# Patient Record
Sex: FEMALE | Race: WHITE | NOT HISPANIC OR LATINO | Employment: FULL TIME | URBAN - METROPOLITAN AREA
[De-identification: names, ages, dates, MRNs, and addresses within clinical notes are randomized per-mention and may not be internally consistent; named-entity substitution may affect disease eponyms.]

---

## 2024-09-08 ENCOUNTER — APPOINTMENT (OUTPATIENT)
Dept: RADIOLOGY | Facility: HOSPITAL | Age: 59
End: 2024-09-08
Payer: COMMERCIAL

## 2024-09-08 ENCOUNTER — HOSPITAL ENCOUNTER (EMERGENCY)
Facility: HOSPITAL | Age: 59
Discharge: HOME | End: 2024-09-08
Payer: COMMERCIAL

## 2024-09-08 VITALS
HEART RATE: 67 BPM | HEIGHT: 62 IN | OXYGEN SATURATION: 98 % | BODY MASS INDEX: 33.31 KG/M2 | DIASTOLIC BLOOD PRESSURE: 100 MMHG | SYSTOLIC BLOOD PRESSURE: 181 MMHG | TEMPERATURE: 97.2 F | WEIGHT: 181 LBS | RESPIRATION RATE: 18 BRPM

## 2024-09-08 DIAGNOSIS — M54.42 ACUTE LEFT-SIDED LOW BACK PAIN WITH LEFT-SIDED SCIATICA: Primary | ICD-10-CM

## 2024-09-08 PROCEDURE — 72100 X-RAY EXAM L-S SPINE 2/3 VWS: CPT | Performed by: RADIOLOGY

## 2024-09-08 PROCEDURE — 2500000001 HC RX 250 WO HCPCS SELF ADMINISTERED DRUGS (ALT 637 FOR MEDICARE OP): Performed by: PHYSICIAN ASSISTANT

## 2024-09-08 PROCEDURE — 2500000004 HC RX 250 GENERAL PHARMACY W/ HCPCS (ALT 636 FOR OP/ED): Performed by: PHYSICIAN ASSISTANT

## 2024-09-08 PROCEDURE — 99283 EMERGENCY DEPT VISIT LOW MDM: CPT

## 2024-09-08 PROCEDURE — 72100 X-RAY EXAM L-S SPINE 2/3 VWS: CPT

## 2024-09-08 PROCEDURE — 96372 THER/PROPH/DIAG INJ SC/IM: CPT | Performed by: PHYSICIAN ASSISTANT

## 2024-09-08 PROCEDURE — 2500000005 HC RX 250 GENERAL PHARMACY W/O HCPCS: Performed by: PHYSICIAN ASSISTANT

## 2024-09-08 RX ORDER — ORPHENADRINE CITRATE 30 MG/ML
60 INJECTION INTRAMUSCULAR; INTRAVENOUS ONCE
Status: COMPLETED | OUTPATIENT
Start: 2024-09-08 | End: 2024-09-08

## 2024-09-08 RX ORDER — CYCLOBENZAPRINE HCL 10 MG
10 TABLET ORAL 2 TIMES DAILY PRN
Qty: 10 TABLET | Refills: 0 | Status: SHIPPED | OUTPATIENT
Start: 2024-09-08 | End: 2024-09-13

## 2024-09-08 RX ORDER — LIDOCAINE 50 MG/G
1 PATCH TOPICAL DAILY
Qty: 5 PATCH | Refills: 0 | Status: SHIPPED | OUTPATIENT
Start: 2024-09-08

## 2024-09-08 RX ORDER — LIDOCAINE 560 MG/1
1 PATCH PERCUTANEOUS; TOPICAL; TRANSDERMAL DAILY
Status: DISCONTINUED | OUTPATIENT
Start: 2024-09-08 | End: 2024-09-08 | Stop reason: HOSPADM

## 2024-09-08 RX ORDER — OXYCODONE AND ACETAMINOPHEN 5; 325 MG/1; MG/1
1 TABLET ORAL ONCE
Status: COMPLETED | OUTPATIENT
Start: 2024-09-08 | End: 2024-09-08

## 2024-09-08 RX ADMIN — LIDOCAINE 4% 1 PATCH: 40 PATCH TOPICAL at 08:38

## 2024-09-08 RX ADMIN — ORPHENADRINE CITRATE 60 MG: 60 INJECTION INTRAMUSCULAR; INTRAVENOUS at 08:38

## 2024-09-08 RX ADMIN — OXYCODONE HYDROCHLORIDE AND ACETAMINOPHEN 1 TABLET: 5; 325 TABLET ORAL at 09:42

## 2024-09-08 ASSESSMENT — LIFESTYLE VARIABLES
HAVE PEOPLE ANNOYED YOU BY CRITICIZING YOUR DRINKING: NO
EVER FELT BAD OR GUILTY ABOUT YOUR DRINKING: NO
HAVE YOU EVER FELT YOU SHOULD CUT DOWN ON YOUR DRINKING: NO
EVER HAD A DRINK FIRST THING IN THE MORNING TO STEADY YOUR NERVES TO GET RID OF A HANGOVER: NO
TOTAL SCORE: 0

## 2024-09-08 ASSESSMENT — PAIN SCALES - GENERAL
PAINLEVEL_OUTOF10: 10 - WORST POSSIBLE PAIN
PAINLEVEL_OUTOF10: 10 - WORST POSSIBLE PAIN

## 2024-09-08 ASSESSMENT — COLUMBIA-SUICIDE SEVERITY RATING SCALE - C-SSRS
2. HAVE YOU ACTUALLY HAD ANY THOUGHTS OF KILLING YOURSELF?: NO
6. HAVE YOU EVER DONE ANYTHING, STARTED TO DO ANYTHING, OR PREPARED TO DO ANYTHING TO END YOUR LIFE?: NO
1. IN THE PAST MONTH, HAVE YOU WISHED YOU WERE DEAD OR WISHED YOU COULD GO TO SLEEP AND NOT WAKE UP?: NO

## 2024-09-08 ASSESSMENT — PAIN DESCRIPTION - LOCATION: LOCATION: BACK

## 2024-09-08 ASSESSMENT — PAIN DESCRIPTION - PAIN TYPE: TYPE: CHRONIC PAIN

## 2024-09-08 ASSESSMENT — PAIN - FUNCTIONAL ASSESSMENT: PAIN_FUNCTIONAL_ASSESSMENT: 0-10

## 2024-09-08 NOTE — ED PROVIDER NOTES
HPI   Chief Complaint   Patient presents with    Back Pain       HPI This is a 59-year-old female with a history of back pain and fibromyalgia who presents with left-sided back pain increasing over the last few days.  She states about 4 weeks ago she had a slight fall and landed on her back.  She states she seemed to be okay but now she just thinks her sciatica is bothering her.  No saddle anesthesia no bowel changes no incontinence.  No fevers.  It hurts when she moves hurts when she changes positions feels best when she is standing.  She does take blood pressure medicine has not taken it yet today does not like to take any other meds except Tylenol which is just not helping it.  He has a primary care doctor [facility] which she has not seen for that yet.  She does not take anything for her fibromyalgia.  Denying any chest pain shortness of breath abdominal pain nausea or vomiting.        Patient History   No past medical history on file.  No past surgical history on file.  No family history on file.  Social History     Tobacco Use    Smoking status: Not on file    Smokeless tobacco: Not on file   Substance Use Topics    Alcohol use: Not on file    Drug use: Not on file       Physical Exam   ED Triage Vitals [09/08/24 0809]   Temperature Heart Rate Respirations BP   36.2 °C (97.2 °F) 67 18 (!) 181/100      Pulse Ox Temp src Heart Rate Source Patient Position   98 % -- -- --      BP Location FiO2 (%)     -- --       Physical Exam      Reviewed family history social history and allergies and were noncontributory to current problem.    Review of systems as noted in history of present illness  otherwise negative. All other systems were reviewed and negative.     PMHX: Chronic conditions: reviewd in EMR, relevant history noted in HPI                Surgeries, hospitalizations: reviewed in EMR , relevant history noted in HPI                Medications: reviewed in EMR, relevant history noted in HPI                 Allergies: reviewed in EMR, relevant history noted in HPI      PHYSICAL EXAM:    GENERAL/ CONSTITUTIONAL: Vitals noted, no distress. Alert and oriented  x 3. Non-toxic.  No Drooling or stridor .    HEAD: Normocephalic Atraumatic    EENT: Posterior oropharynx unremarkable. No meningismus. No LAD.  No exudate present.      EYES: PERRLA EOMI     NECK: Supple. Nontender. No midline tenderness.  Full range of motion    CARDIAC: Regular, rate, rhythm. No murmurs rubs or gallops. No JVD    PULMONARY: Lungs clear bilaterally with good aeration. No wheezes rales or rhonchi. No respiratory distress.  No Pain with deep inspiration    GI: Soft, . Nontender. No peritoneal signs. Normoactive bowel sounds. No pulsatile masses.  No guarding or rebound    EXTREMITIES/MUSCULOSKELTAL: No peripheral edema. Negative Homans bilaterally, NVIT, pulses +2 /4 equal. FROM in all extremities and equal.     BACK : Range of motion but has tenderness with flexion rotation.  No lesions noted.  Position changing from sitting to standing causes pain in the same region.  Patient's pain travels from the left back down to the left buttock down to the posterior aspect behind the left knee.  Leg raise test is negative however when she does move her leg she experiences some pain in her buttock and states it feels like it is stretching actually.There is tenderness to palpation in the midline and paraspinal planes throughout the LS. Normal motor sensory, symmetric reflexes, strong equal peripheral pulses, and normal Babinski's bilaterally. FROM with pain to flexion extension, rotation.  Good muscle tone.     SKIN: No rash. Intact.     NEURO: No focal neurologic deficits,     PSYCH: appropriate mood and affect    MEDICAL DECISION MAKING:    ED Course & MDM   ED Course as of 09/08/24 0934   [place] Sep 08, 2024   0924 Disc space loss from L2 through L4. Endplate sclerosis and  osteophytes at multiple levels   [CV]      ED Course User Index  [CV] Sharri LEOS  CONCHIS Leal         Diagnoses as of 09/08/24 0934   Acute left-sided low back pain with left-sided sciatica        Imaging Norflex given lidocaine patch.  Imaging As noted above L2-L4         No data recorded     [place] Coma Scale Score: 15 (09/08/24 0806 : Bibi Parker, ANKIT)                           Medical Decision Making  Home-going on muscle relaxant and lidocaine patches.  Measures as discussed as well.    Procedure  Procedures     Sharri Leal PA-C  09/08/24 0992

## 2024-09-08 NOTE — ED TRIAGE NOTES
Pt presents to ED with c/o back pain radiating down her left leg, hx of sciatica. Pt denies relief with heat and ice. Pt ambulatory during triage.

## 2025-02-05 ENCOUNTER — APPOINTMENT (OUTPATIENT)
Dept: CARDIOLOGY | Facility: HOSPITAL | Age: 60
End: 2025-02-05
Payer: COMMERCIAL

## 2025-02-05 ENCOUNTER — APPOINTMENT (OUTPATIENT)
Dept: RADIOLOGY | Facility: HOSPITAL | Age: 60
End: 2025-02-05
Payer: COMMERCIAL

## 2025-02-05 ENCOUNTER — HOSPITAL ENCOUNTER (INPATIENT)
Facility: HOSPITAL | Age: 60
LOS: 1 days | Discharge: HOME | End: 2025-02-06
Attending: STUDENT IN AN ORGANIZED HEALTH CARE EDUCATION/TRAINING PROGRAM | Admitting: INTERNAL MEDICINE
Payer: COMMERCIAL

## 2025-02-05 DIAGNOSIS — I48.91 NEW ONSET ATRIAL FIBRILLATION (MULTI): Primary | ICD-10-CM

## 2025-02-05 LAB
ALBUMIN SERPL BCP-MCNC: 4.2 G/DL (ref 3.4–5)
ALP SERPL-CCNC: 96 U/L (ref 33–110)
ALT SERPL W P-5'-P-CCNC: 14 U/L (ref 7–45)
ANION GAP SERPL CALC-SCNC: 13 MMOL/L (ref 10–20)
AST SERPL W P-5'-P-CCNC: 17 U/L (ref 9–39)
BASOPHILS # BLD AUTO: 0.06 X10*3/UL (ref 0–0.1)
BASOPHILS NFR BLD AUTO: 0.6 %
BILIRUB SERPL-MCNC: 0.3 MG/DL (ref 0–1.2)
BNP SERPL-MCNC: 52 PG/ML (ref 0–99)
BUN SERPL-MCNC: 26 MG/DL (ref 6–23)
CALCIUM SERPL-MCNC: 9.1 MG/DL (ref 8.6–10.3)
CARDIAC TROPONIN I PNL SERPL HS: 21 NG/L (ref 0–13)
CARDIAC TROPONIN I PNL SERPL HS: 3 NG/L (ref 0–13)
CHLORIDE SERPL-SCNC: 104 MMOL/L (ref 98–107)
CO2 SERPL-SCNC: 26 MMOL/L (ref 21–32)
CREAT SERPL-MCNC: 0.9 MG/DL (ref 0.5–1.05)
EGFRCR SERPLBLD CKD-EPI 2021: 74 ML/MIN/1.73M*2
EOSINOPHIL # BLD AUTO: 0.3 X10*3/UL (ref 0–0.7)
EOSINOPHIL NFR BLD AUTO: 2.8 %
ERYTHROCYTE [DISTWIDTH] IN BLOOD BY AUTOMATED COUNT: 12.9 % (ref 11.5–14.5)
GLUCOSE SERPL-MCNC: 111 MG/DL (ref 74–99)
HCT VFR BLD AUTO: 46.1 % (ref 36–46)
HGB BLD-MCNC: 15.3 G/DL (ref 12–16)
IMM GRANULOCYTES # BLD AUTO: 0.03 X10*3/UL (ref 0–0.7)
IMM GRANULOCYTES NFR BLD AUTO: 0.3 % (ref 0–0.9)
LYMPHOCYTES # BLD AUTO: 4.91 X10*3/UL (ref 1.2–4.8)
LYMPHOCYTES NFR BLD AUTO: 45.1 %
MCH RBC QN AUTO: 30.8 PG (ref 26–34)
MCHC RBC AUTO-ENTMCNC: 33.2 G/DL (ref 32–36)
MCV RBC AUTO: 93 FL (ref 80–100)
MONOCYTES # BLD AUTO: 0.86 X10*3/UL (ref 0.1–1)
MONOCYTES NFR BLD AUTO: 7.9 %
NEUTROPHILS # BLD AUTO: 4.73 X10*3/UL (ref 1.2–7.7)
NEUTROPHILS NFR BLD AUTO: 43.3 %
NRBC BLD-RTO: 0 /100 WBCS (ref 0–0)
PLATELET # BLD AUTO: 235 X10*3/UL (ref 150–450)
POTASSIUM SERPL-SCNC: 3.7 MMOL/L (ref 3.5–5.3)
PROT SERPL-MCNC: 6.8 G/DL (ref 6.4–8.2)
RBC # BLD AUTO: 4.96 X10*6/UL (ref 4–5.2)
SODIUM SERPL-SCNC: 139 MMOL/L (ref 136–145)
WBC # BLD AUTO: 10.9 X10*3/UL (ref 4.4–11.3)

## 2025-02-05 PROCEDURE — 84075 ASSAY ALKALINE PHOSPHATASE: CPT | Performed by: STUDENT IN AN ORGANIZED HEALTH CARE EDUCATION/TRAINING PROGRAM

## 2025-02-05 PROCEDURE — 84439 ASSAY OF FREE THYROXINE: CPT | Performed by: INTERNAL MEDICINE

## 2025-02-05 PROCEDURE — 84443 ASSAY THYROID STIM HORMONE: CPT | Performed by: INTERNAL MEDICINE

## 2025-02-05 PROCEDURE — 93005 ELECTROCARDIOGRAM TRACING: CPT

## 2025-02-05 PROCEDURE — 84484 ASSAY OF TROPONIN QUANT: CPT | Performed by: STUDENT IN AN ORGANIZED HEALTH CARE EDUCATION/TRAINING PROGRAM

## 2025-02-05 PROCEDURE — 99285 EMERGENCY DEPT VISIT HI MDM: CPT | Mod: 25 | Performed by: STUDENT IN AN ORGANIZED HEALTH CARE EDUCATION/TRAINING PROGRAM

## 2025-02-05 PROCEDURE — 36415 COLL VENOUS BLD VENIPUNCTURE: CPT | Performed by: STUDENT IN AN ORGANIZED HEALTH CARE EDUCATION/TRAINING PROGRAM

## 2025-02-05 PROCEDURE — 96376 TX/PRO/DX INJ SAME DRUG ADON: CPT

## 2025-02-05 PROCEDURE — 71045 X-RAY EXAM CHEST 1 VIEW: CPT

## 2025-02-05 PROCEDURE — 96374 THER/PROPH/DIAG INJ IV PUSH: CPT

## 2025-02-05 PROCEDURE — 96366 THER/PROPH/DIAG IV INF ADDON: CPT

## 2025-02-05 PROCEDURE — 83735 ASSAY OF MAGNESIUM: CPT | Performed by: INTERNAL MEDICINE

## 2025-02-05 PROCEDURE — 71045 X-RAY EXAM CHEST 1 VIEW: CPT | Performed by: RADIOLOGY

## 2025-02-05 PROCEDURE — 85025 COMPLETE CBC W/AUTO DIFF WBC: CPT | Performed by: STUDENT IN AN ORGANIZED HEALTH CARE EDUCATION/TRAINING PROGRAM

## 2025-02-05 PROCEDURE — 96365 THER/PROPH/DIAG IV INF INIT: CPT

## 2025-02-05 PROCEDURE — 83880 ASSAY OF NATRIURETIC PEPTIDE: CPT | Performed by: STUDENT IN AN ORGANIZED HEALTH CARE EDUCATION/TRAINING PROGRAM

## 2025-02-05 PROCEDURE — 99222 1ST HOSP IP/OBS MODERATE 55: CPT | Performed by: INTERNAL MEDICINE

## 2025-02-05 PROCEDURE — 2500000004 HC RX 250 GENERAL PHARMACY W/ HCPCS (ALT 636 FOR OP/ED): Performed by: STUDENT IN AN ORGANIZED HEALTH CARE EDUCATION/TRAINING PROGRAM

## 2025-02-05 PROCEDURE — 2060000001 HC INTERMEDIATE ICU ROOM DAILY

## 2025-02-05 RX ORDER — ACETAMINOPHEN 650 MG/1
650 SUPPOSITORY RECTAL EVERY 6 HOURS PRN
Status: DISCONTINUED | OUTPATIENT
Start: 2025-02-05 | End: 2025-02-06 | Stop reason: HOSPADM

## 2025-02-05 RX ORDER — POLYETHYLENE GLYCOL 3350 17 G/17G
17 POWDER, FOR SOLUTION ORAL DAILY PRN
Status: DISCONTINUED | OUTPATIENT
Start: 2025-02-05 | End: 2025-02-06 | Stop reason: HOSPADM

## 2025-02-05 RX ORDER — ACETAMINOPHEN 160 MG/5ML
650 SOLUTION ORAL EVERY 6 HOURS PRN
Status: DISCONTINUED | OUTPATIENT
Start: 2025-02-05 | End: 2025-02-06 | Stop reason: HOSPADM

## 2025-02-05 RX ORDER — ACETAMINOPHEN 325 MG/1
650 TABLET ORAL EVERY 6 HOURS PRN
Status: DISCONTINUED | OUTPATIENT
Start: 2025-02-05 | End: 2025-02-06 | Stop reason: HOSPADM

## 2025-02-05 RX ORDER — ALUMINUM HYDROXIDE, MAGNESIUM HYDROXIDE, AND SIMETHICONE 1200; 120; 1200 MG/30ML; MG/30ML; MG/30ML
30 SUSPENSION ORAL EVERY 6 HOURS PRN
Status: DISCONTINUED | OUTPATIENT
Start: 2025-02-05 | End: 2025-02-06 | Stop reason: HOSPADM

## 2025-02-05 RX ORDER — TALC
3 POWDER (GRAM) TOPICAL NIGHTLY PRN
Status: DISCONTINUED | OUTPATIENT
Start: 2025-02-05 | End: 2025-02-06 | Stop reason: HOSPADM

## 2025-02-05 RX ORDER — DILTIAZEM HCL/D5W 125 MG/125
5-15 PLASTIC BAG, INJECTION (ML) INTRAVENOUS CONTINUOUS
Status: DISCONTINUED | OUTPATIENT
Start: 2025-02-05 | End: 2025-02-06 | Stop reason: HOSPADM

## 2025-02-05 RX ADMIN — Medication 5 MG/HR: at 21:17

## 2025-02-05 ASSESSMENT — COLUMBIA-SUICIDE SEVERITY RATING SCALE - C-SSRS
6. HAVE YOU EVER DONE ANYTHING, STARTED TO DO ANYTHING, OR PREPARED TO DO ANYTHING TO END YOUR LIFE?: NO
2. HAVE YOU ACTUALLY HAD ANY THOUGHTS OF KILLING YOURSELF?: NO
1. IN THE PAST MONTH, HAVE YOU WISHED YOU WERE DEAD OR WISHED YOU COULD GO TO SLEEP AND NOT WAKE UP?: NO

## 2025-02-05 ASSESSMENT — PAIN - FUNCTIONAL ASSESSMENT: PAIN_FUNCTIONAL_ASSESSMENT: 0-10

## 2025-02-05 ASSESSMENT — PAIN SCALES - GENERAL
PAINLEVEL_OUTOF10: 0 - NO PAIN
PAINLEVEL_OUTOF10: 0 - NO PAIN

## 2025-02-05 ASSESSMENT — ENCOUNTER SYMPTOMS
PALPITATIONS: 1
CHEST TIGHTNESS: 1
FATIGUE: 1

## 2025-02-05 ASSESSMENT — PAIN DESCRIPTION - DESCRIPTORS
DESCRIPTORS: PRESSURE
DESCRIPTORS: DISCOMFORT;PRESSURE

## 2025-02-06 ENCOUNTER — PHARMACY VISIT (OUTPATIENT)
Dept: PHARMACY | Facility: CLINIC | Age: 60
End: 2025-02-06
Payer: COMMERCIAL

## 2025-02-06 VITALS
OXYGEN SATURATION: 98 % | BODY MASS INDEX: 36.07 KG/M2 | SYSTOLIC BLOOD PRESSURE: 125 MMHG | HEIGHT: 62 IN | RESPIRATION RATE: 16 BRPM | HEART RATE: 120 BPM | WEIGHT: 196 LBS | DIASTOLIC BLOOD PRESSURE: 74 MMHG

## 2025-02-06 PROBLEM — I48.91 NEW ONSET ATRIAL FIBRILLATION (MULTI): Status: RESOLVED | Noted: 2025-02-05 | Resolved: 2025-02-06

## 2025-02-06 LAB
ANION GAP SERPL CALC-SCNC: 12 MMOL/L (ref 10–20)
BUN SERPL-MCNC: 21 MG/DL (ref 6–23)
CALCIUM SERPL-MCNC: 8.4 MG/DL (ref 8.6–10.3)
CARDIAC TROPONIN I PNL SERPL HS: 80 NG/L (ref 0–13)
CHLORIDE SERPL-SCNC: 109 MMOL/L (ref 98–107)
CO2 SERPL-SCNC: 25 MMOL/L (ref 21–32)
CREAT SERPL-MCNC: 0.7 MG/DL (ref 0.5–1.05)
EGFRCR SERPLBLD CKD-EPI 2021: >90 ML/MIN/1.73M*2
ERYTHROCYTE [DISTWIDTH] IN BLOOD BY AUTOMATED COUNT: 12.9 % (ref 11.5–14.5)
GLUCOSE SERPL-MCNC: 92 MG/DL (ref 74–99)
HCT VFR BLD AUTO: 39.9 % (ref 36–46)
HGB BLD-MCNC: 13.3 G/DL (ref 12–16)
MAGNESIUM SERPL-MCNC: 2.12 MG/DL (ref 1.6–2.4)
MCH RBC QN AUTO: 30.3 PG (ref 26–34)
MCHC RBC AUTO-ENTMCNC: 33.3 G/DL (ref 32–36)
MCV RBC AUTO: 91 FL (ref 80–100)
NRBC BLD-RTO: 0 /100 WBCS (ref 0–0)
PLATELET # BLD AUTO: 195 X10*3/UL (ref 150–450)
POTASSIUM SERPL-SCNC: 3.9 MMOL/L (ref 3.5–5.3)
RBC # BLD AUTO: 4.39 X10*6/UL (ref 4–5.2)
SODIUM SERPL-SCNC: 142 MMOL/L (ref 136–145)
T4 FREE SERPL-MCNC: 1 NG/DL (ref 0.61–1.12)
TSH SERPL-ACNC: 9.11 MIU/L (ref 0.44–3.98)
WBC # BLD AUTO: 9.5 X10*3/UL (ref 4.4–11.3)

## 2025-02-06 PROCEDURE — 85027 COMPLETE CBC AUTOMATED: CPT | Performed by: INTERNAL MEDICINE

## 2025-02-06 PROCEDURE — G0378 HOSPITAL OBSERVATION PER HR: HCPCS

## 2025-02-06 PROCEDURE — RXMED WILLOW AMBULATORY MEDICATION CHARGE

## 2025-02-06 PROCEDURE — 2500000004 HC RX 250 GENERAL PHARMACY W/ HCPCS (ALT 636 FOR OP/ED): Performed by: INTERNAL MEDICINE

## 2025-02-06 PROCEDURE — 2500000004 HC RX 250 GENERAL PHARMACY W/ HCPCS (ALT 636 FOR OP/ED): Performed by: STUDENT IN AN ORGANIZED HEALTH CARE EDUCATION/TRAINING PROGRAM

## 2025-02-06 PROCEDURE — 36415 COLL VENOUS BLD VENIPUNCTURE: CPT | Performed by: INTERNAL MEDICINE

## 2025-02-06 PROCEDURE — 99238 HOSP IP/OBS DSCHRG MGMT 30/<: CPT | Performed by: INTERNAL MEDICINE

## 2025-02-06 PROCEDURE — 96361 HYDRATE IV INFUSION ADD-ON: CPT

## 2025-02-06 PROCEDURE — 84484 ASSAY OF TROPONIN QUANT: CPT | Performed by: INTERNAL MEDICINE

## 2025-02-06 PROCEDURE — 80048 BASIC METABOLIC PNL TOTAL CA: CPT | Performed by: INTERNAL MEDICINE

## 2025-02-06 RX ORDER — METOPROLOL TARTRATE 50 MG/1
50 TABLET ORAL 2 TIMES DAILY
Qty: 60 TABLET | Refills: 1 | Status: SHIPPED | OUTPATIENT
Start: 2025-02-06 | End: 2025-04-07

## 2025-02-06 RX ORDER — ATORVASTATIN CALCIUM 20 MG/1
20 TABLET, FILM COATED ORAL DAILY
COMMUNITY

## 2025-02-06 RX ORDER — LANOLIN ALCOHOL/MO/W.PET/CERES
1000 CREAM (GRAM) TOPICAL DAILY
COMMUNITY

## 2025-02-06 RX ORDER — ESOMEPRAZOLE MAGNESIUM 40 MG/1
40 CAPSULE, DELAYED RELEASE ORAL
COMMUNITY

## 2025-02-06 RX ORDER — ENOXAPARIN SODIUM 100 MG/ML
1 INJECTION SUBCUTANEOUS EVERY 12 HOURS
Status: DISCONTINUED | OUTPATIENT
Start: 2025-02-06 | End: 2025-02-06 | Stop reason: HOSPADM

## 2025-02-06 RX ORDER — VIT C/E/ZN/COPPR/LUTEIN/ZEAXAN 250MG-90MG
50 CAPSULE ORAL DAILY
COMMUNITY

## 2025-02-06 RX ORDER — DILTIAZEM HYDROCHLORIDE 180 MG/1
180 CAPSULE, COATED, EXTENDED RELEASE ORAL DAILY
Qty: 30 CAPSULE | Refills: 1 | Status: SHIPPED | OUTPATIENT
Start: 2025-02-06 | End: 2025-04-07

## 2025-02-06 RX ADMIN — SODIUM CHLORIDE 1000 ML: 9 INJECTION, SOLUTION INTRAVENOUS at 02:54

## 2025-02-06 RX ADMIN — ENOXAPARIN SODIUM 90 MG: 100 INJECTION SUBCUTANEOUS at 00:57

## 2025-02-06 RX ADMIN — Medication 5 MG/HR: at 04:38

## 2025-02-06 SDOH — ECONOMIC STABILITY: HOUSING INSECURITY: IN THE PAST 12 MONTHS, HOW MANY TIMES HAVE YOU MOVED WHERE YOU WERE LIVING?: 1

## 2025-02-06 SDOH — ECONOMIC STABILITY: INCOME INSECURITY: IN THE PAST 12 MONTHS HAS THE ELECTRIC, GAS, OIL, OR WATER COMPANY THREATENED TO SHUT OFF SERVICES IN YOUR HOME?: NO

## 2025-02-06 SDOH — SOCIAL STABILITY: SOCIAL INSECURITY
WITHIN THE LAST YEAR, HAVE YOU BEEN RAPED OR FORCED TO HAVE ANY KIND OF SEXUAL ACTIVITY BY YOUR PARTNER OR EX-PARTNER?: NO

## 2025-02-06 SDOH — SOCIAL STABILITY: SOCIAL INSECURITY: HAVE YOU HAD ANY THOUGHTS OF HARMING ANYONE ELSE?: NO

## 2025-02-06 SDOH — ECONOMIC STABILITY: HOUSING INSECURITY: IN THE LAST 12 MONTHS, WAS THERE A TIME WHEN YOU WERE NOT ABLE TO PAY THE MORTGAGE OR RENT ON TIME?: NO

## 2025-02-06 SDOH — SOCIAL STABILITY: SOCIAL INSECURITY: ABUSE: ADULT

## 2025-02-06 SDOH — SOCIAL STABILITY: SOCIAL INSECURITY
WITHIN THE LAST YEAR, HAVE YOU BEEN KICKED, HIT, SLAPPED, OR OTHERWISE PHYSICALLY HURT BY YOUR PARTNER OR EX-PARTNER?: NO

## 2025-02-06 SDOH — ECONOMIC STABILITY: FOOD INSECURITY: WITHIN THE PAST 12 MONTHS, THE FOOD YOU BOUGHT JUST DIDN'T LAST AND YOU DIDN'T HAVE MONEY TO GET MORE.: NEVER TRUE

## 2025-02-06 SDOH — SOCIAL STABILITY: SOCIAL INSECURITY: WERE YOU ABLE TO COMPLETE ALL THE BEHAVIORAL HEALTH SCREENINGS?: YES

## 2025-02-06 SDOH — SOCIAL STABILITY: SOCIAL INSECURITY: HAVE YOU HAD THOUGHTS OF HARMING ANYONE ELSE?: NO

## 2025-02-06 SDOH — SOCIAL STABILITY: SOCIAL INSECURITY: DO YOU FEEL ANYONE HAS EXPLOITED OR TAKEN ADVANTAGE OF YOU FINANCIALLY OR OF YOUR PERSONAL PROPERTY?: NO

## 2025-02-06 SDOH — SOCIAL STABILITY: SOCIAL INSECURITY: WITHIN THE LAST YEAR, HAVE YOU BEEN HUMILIATED OR EMOTIONALLY ABUSED IN OTHER WAYS BY YOUR PARTNER OR EX-PARTNER?: NO

## 2025-02-06 SDOH — ECONOMIC STABILITY: FOOD INSECURITY: HOW HARD IS IT FOR YOU TO PAY FOR THE VERY BASICS LIKE FOOD, HOUSING, MEDICAL CARE, AND HEATING?: NOT VERY HARD

## 2025-02-06 SDOH — SOCIAL STABILITY: SOCIAL INSECURITY: ARE YOU OR HAVE YOU BEEN THREATENED OR ABUSED PHYSICALLY, EMOTIONALLY, OR SEXUALLY BY ANYONE?: NO

## 2025-02-06 SDOH — ECONOMIC STABILITY: HOUSING INSECURITY: AT ANY TIME IN THE PAST 12 MONTHS, WERE YOU HOMELESS OR LIVING IN A SHELTER (INCLUDING NOW)?: NO

## 2025-02-06 SDOH — SOCIAL STABILITY: SOCIAL INSECURITY: WITHIN THE LAST YEAR, HAVE YOU BEEN AFRAID OF YOUR PARTNER OR EX-PARTNER?: NO

## 2025-02-06 SDOH — ECONOMIC STABILITY: FOOD INSECURITY: WITHIN THE PAST 12 MONTHS, YOU WORRIED THAT YOUR FOOD WOULD RUN OUT BEFORE YOU GOT THE MONEY TO BUY MORE.: NEVER TRUE

## 2025-02-06 SDOH — SOCIAL STABILITY: SOCIAL INSECURITY: DOES ANYONE TRY TO KEEP YOU FROM HAVING/CONTACTING OTHER FRIENDS OR DOING THINGS OUTSIDE YOUR HOME?: NO

## 2025-02-06 SDOH — ECONOMIC STABILITY: TRANSPORTATION INSECURITY: IN THE PAST 12 MONTHS, HAS LACK OF TRANSPORTATION KEPT YOU FROM MEDICAL APPOINTMENTS OR FROM GETTING MEDICATIONS?: NO

## 2025-02-06 SDOH — SOCIAL STABILITY: SOCIAL INSECURITY: ARE THERE ANY APPARENT SIGNS OF INJURIES/BEHAVIORS THAT COULD BE RELATED TO ABUSE/NEGLECT?: NO

## 2025-02-06 SDOH — SOCIAL STABILITY: SOCIAL INSECURITY: HAS ANYONE EVER THREATENED TO HURT YOUR FAMILY OR YOUR PETS?: NO

## 2025-02-06 SDOH — SOCIAL STABILITY: SOCIAL INSECURITY: DO YOU FEEL UNSAFE GOING BACK TO THE PLACE WHERE YOU ARE LIVING?: NO

## 2025-02-06 ASSESSMENT — ACTIVITIES OF DAILY LIVING (ADL)
ADEQUATE_TO_COMPLETE_ADL: YES
BATHING: INDEPENDENT
DRESSING YOURSELF: INDEPENDENT
FEEDING YOURSELF: INDEPENDENT
HEARING - LEFT EAR: FUNCTIONAL
WALKS IN HOME: INDEPENDENT
HEARING - RIGHT EAR: FUNCTIONAL
LACK_OF_TRANSPORTATION: NO
GROOMING: INDEPENDENT
TOILETING: INDEPENDENT
PATIENT'S MEMORY ADEQUATE TO SAFELY COMPLETE DAILY ACTIVITIES?: YES
JUDGMENT_ADEQUATE_SAFELY_COMPLETE_DAILY_ACTIVITIES: YES
LACK_OF_TRANSPORTATION: NO

## 2025-02-06 ASSESSMENT — COGNITIVE AND FUNCTIONAL STATUS - GENERAL
MOBILITY SCORE: 24
PATIENT BASELINE BEDBOUND: NO
DAILY ACTIVITIY SCORE: 24

## 2025-02-06 ASSESSMENT — LIFESTYLE VARIABLES
PRESCIPTION_ABUSE_PAST_12_MONTHS: NO
HOW MANY STANDARD DRINKS CONTAINING ALCOHOL DO YOU HAVE ON A TYPICAL DAY: PATIENT DOES NOT DRINK
SKIP TO QUESTIONS 9-10: 1
SUBSTANCE_ABUSE_PAST_12_MONTHS: NO
HOW OFTEN DO YOU HAVE A DRINK CONTAINING ALCOHOL: NEVER
AUDIT-C TOTAL SCORE: 0
AUDIT-C TOTAL SCORE: 0
HOW OFTEN DO YOU HAVE 6 OR MORE DRINKS ON ONE OCCASION: NEVER

## 2025-02-06 ASSESSMENT — PATIENT HEALTH QUESTIONNAIRE - PHQ9
1. LITTLE INTEREST OR PLEASURE IN DOING THINGS: NOT AT ALL
2. FEELING DOWN, DEPRESSED OR HOPELESS: NOT AT ALL
SUM OF ALL RESPONSES TO PHQ9 QUESTIONS 1 & 2: 0

## 2025-02-06 ASSESSMENT — PAIN SCALES - GENERAL
PAINLEVEL_OUTOF10: 0 - NO PAIN
PAINLEVEL_OUTOF10: 0 - NO PAIN

## 2025-02-06 NOTE — CONSULTS
Cardiology Consult    Impression:  Paroxysmal atrial fibrillation currently rate controlled  [id number] 2  Hypertension off antihypertensives and lifestyle changes  Obstructive sleep apnea on CPAP  Hypothyroidism per primary  Plan:  discussed options extensively (TODD cardioversion now versus oral anticoagulation and cardioversion in 4 weeks as an outpatient: She prefers the later especially she is asymptomatic and rate is controlled that is very reasonable)    Rate is controlled.  Currently asymptomatic as he is not aware of arrhythmia.  Recommend stopping Cardizem drip.  Start metoprolol 50 twice daily and Cardizem 180 extended release daily.  Discharged on Eliquis 5 twice daily  Patient can be cardioverted if remains in A-fib after 4 weeks of being on oral anticoagulation without TODD.  Will refer to EP to discuss rhythm control strategy/RFA.  Patient can be discharged from cardiac standpoint.      HPI:  59-year-old with history of remote tachycardia, not on any cardiac medications, presents with lightheadedness and palpitation found to be in A-fib with RVR.  Started on Cardizem and now rate controlled with Cardizem at 5.  She is unaware of arrhythmia.  She has no prior history of CAD PAD CHF.    Meds:  Scheduled medications  enoxaparin, 1 mg/kg, subcutaneous, q12h      Continuous medications  dilTIAZem, 5-15 mg/hr, Last Rate: 10 mg/hr (02/06/25 0949)      PRN medications  PRN medications: acetaminophen **OR** acetaminophen **OR** acetaminophen, alum-mag hydroxide-simeth, melatonin, polyethylene glycol    PMHx:  Reviewed  Social history:  Does not smoke or drink alcohol  Family history:  [place]  Review of systems:  10 system reviewed and negative or detailed in HPI  Physical exam:  Vitals:    02/06/25 0949   BP: 125/74   Pulse: (!) 120   Resp: 16   SpO2: 98%      Awake alert oriented not in acute distress unaware of arrhythmia.  Irregular normal S1-S2 rate 80/90 bpm on telemetry.  Good bilateral air entry clear to  auscultation abdomen soft bowel sounds positive  EKG:  A-fib rate controlled  Echo:  No results found for this or any previous visit.   Labs:  Lab Results   Component Value Date    WBC 9.5 02/06/2025    HGB 13.3 02/06/2025    HCT 39.9 02/06/2025     02/06/2025    ALT 14 02/05/2025    AST 17 02/05/2025     02/06/2025    K 3.9 02/06/2025     (H) 02/06/2025    CREATININE 0.70 02/06/2025    BUN 21 02/06/2025    CO2 25 02/06/2025    TSH 9.11 (H) 02/05/2025    HGBA1C 5.2 04/18/2023     par

## 2025-02-06 NOTE — PROGRESS NOTES
Janis Lynne is a 59 y.o. female on day 1 of admission presenting with New onset atrial fibrillation (Multi).      Subjective   Resting in bed.  Rate controlled on cardizem drip, but remains in Afib.       Objective     Last Recorded Vitals  /70   Pulse 91   Resp 16   Wt 88.9 kg (196 lb)   SpO2 97%   Intake/Output last 3 Shifts:  No intake or output data in the 24 hours ending 02/06/25 0912    Admission Weight  Weight: 88.9 kg (196 lb) (02/05/25 2104)    Daily Weight  02/05/25 : 88.9 kg (196 lb)    Image Results  XR chest 1 view  Narrative: Interpreted By:  Dakota Guzman,   STUDY:  XR CHEST 1 VIEW;  2/5/2025 9:15 pm      INDICATION:  Signs/Symptoms:chest pain.      COMPARISON:  3/30/2023      ACCESSION NUMBER(S):  [id number]      ORDERING CLINICIAN:  SOLOMON HERRERA      FINDINGS:  The cardiac silhouette is normal in size. No focal airspace  consolidation or pleural effusion. No pneumothorax.      Impression: No airspace consolidation or pleural effusion.      MACRO:  None      Signed by: Dakota Guzman 2/5/2025 9:21 PM  Dictation workstation:   [id number]      Physical Exam  Vitals reviewed.   Constitutional:       Appearance: Normal appearance.   HENT:      Head: Normocephalic and atraumatic.      Mouth/Throat:      Mouth: Mucous membranes are moist.   Eyes:      Conjunctiva/sclera: Conjunctivae normal.   Cardiovascular:      Rate and Rhythm: Normal rate. Rhythm irregular.      Pulses: Normal pulses.   Pulmonary:      Effort: Pulmonary effort is normal.      Breath sounds: Normal breath sounds. No wheezing.   Abdominal:      General: Abdomen is flat. There is no distension.      Palpations: Abdomen is soft.      Tenderness: There is no guarding.   Musculoskeletal:         General: No swelling. Normal range of motion.   Skin:     General: Skin is warm and dry.   Neurological:      General: No focal deficit present.      Mental Status: She is alert. Mental status is at baseline.   Psychiatric:          Mood and Affect: Mood normal.         Behavior: Behavior normal.         Relevant Results               Assessment/Plan          Assessment & Plan  New onset atrial fibrillation (Multi)      New onset Afib - now rate controlled  Demand ischemia  HTN  NIKIA on CPAP  GERD  Hx breast cancer  Hx SVT with hx cardioversion    Cardiology consult pending.  For now continue cardizem drip for rate control.  Full anticoagulation with lovenox now, patient agreeable to DOAC later today/ tomorrow.  T4 stable  Troponin mildly elevated in setting of Afib RVR without chest pains  Patient has diet today, consider NPO tomorrow if cardiology considers cardioversion    Rafal Allen MD

## 2025-02-06 NOTE — DISCHARGE SUMMARY
Discharge Diagnosis  New onset atrial fibrillation (Multi)    Issues Requiring Follow-Up    Follow up with PCP and cardiology.    Discharge Meds     Medication List      START taking these medications     apixaban 5 mg tablet; Commonly known as: Eliquis; Take 1 tablet (5 mg)   by mouth 2 times a day.   dilTIAZem  mg 24 hr capsule; Commonly known as: Cardizem CD; Take   1 capsule (180 mg) by mouth once daily.   metoprolol tartrate 50 mg tablet; Commonly known as: Lopressor; Take 1   tablet by mouth 2 times a day.     CONTINUE taking these medications     atorvastatin 20 mg tablet; Commonly known as: Lipitor   cholecalciferol 25 MCG (1000 UT) capsule; Commonly known as: Vitamin D-3   cyclobenzaprine 10 mg tablet; Commonly known as: Flexeril; Take 1 tablet   (10 mg) by mouth 2 times a day as needed for muscle spasms for up to 5   days.   esomeprazole 40 mg DR capsule; Commonly known as: NexIUM   ICAPS AREDS ORAL   lidocaine 5 % patch; Commonly known as: Lidoderm; Place 1 patch over 12   hours on the skin once daily. Remove & discard patch within 12 hours or as   directed by MD.     ASK your doctor about these medications     cyanocobalamin 1,000 mcg tablet; Commonly known as: Vitamin B-12       Test Results Pending At Discharge  Pending Labs       No current pending labs.            Hospital Course     Janis Lynne is a 59 y.o. female with HTN, HLD, NIKIA on CPAP, GERD, hx breast ca s/p R lumpectomy, hx SVT in setting of COVID s/p CV, who presents with sudden onset palpitations and found in new onset Afib with RVR.  Patient treated with cardizem and converted to sinus rhythm.  Patient rate was stable in SR and seen by cardiology.  Stable to dc home with lopressor 50 mg twice daily, Cardizem cd 180, and Eliquis.  Patient will see PCP in one week and follow up with cardiology in one month.    Pertinent Physical Exam At Time of Discharge  Physical Exam  Vitals reviewed.   Constitutional:       Appearance: Normal  appearance.   HENT:      Head: Normocephalic and atraumatic.      Mouth/Throat:      Mouth: Mucous membranes are moist.   Eyes:      Conjunctiva/sclera: Conjunctivae normal.   Cardiovascular:      Rate and Rhythm: Normal rate.      Pulses: Normal pulses.   Pulmonary:      Effort: Pulmonary effort is normal.      Breath sounds: Normal breath sounds. No wheezing.   Abdominal:      General: Abdomen is flat. There is no distension.      Palpations: Abdomen is soft.      Tenderness: There is no guarding.   Musculoskeletal:         General: No swelling. Normal range of motion.   Skin:     General: Skin is warm and dry.   Neurological:      General: No focal deficit present.      Mental Status: She is alert. Mental status is at baseline.   Psychiatric:         Mood and Affect: Mood normal.         Behavior: Behavior normal.         Outpatient Follow-Up  No future appointments.      Rafal Allen MD

## 2025-02-06 NOTE — ED TRIAGE NOTES
"Pt BIBA for c/o \"racing heart\" and shortness of breath. Pt afib RVR on EMS EKG. Pt alert and oriented x4. All other vitals WNL  "

## 2025-02-06 NOTE — H&P
"Hospital Medicine History & Physical    Subjective:  Janis Lynne is a 59 y.o. female with HTN, HLD, NIKIA on CPAP, GERD, hx breast ca s/p R lumpectomy, hx SVT in setting of COVID s/p CV, who presents with sudden onset palpitations and mild assoc chest tightness. Symptoms began around 9pm after pt woke up from a nap. She endorses feeling fatigued all day but attributed this to working night shift last night. No additional symptoms.     A 10 point ROS was completed and is negative expect as stated in HPI.     PMHx: As above  PSHx: R lumpectomy, hysterectomy, cardioversion  Social Hx: never smoker, denies alcohol use, denies illicit drug use  Family Hx: leukemia, DM    Objective:    /71   Pulse 91   Resp 14   Ht 1.575 m (5' 2\")   Wt 88.9 kg (196 lb)   SpO2 95%   BMI 35.85 kg/m²     Physical Exam:  General: A&Ox3, no distress, cooperative  HEENT: NC/AT, clear sclera, MMM  NECK: Supple  Cardiovascular: Tachycardic, irregularly irregular, no murmurs. S1/S2  Respiratory: CTAB, no RRW or crackles. No distress  Abdomen: Soft, ND, non-tender. BS+  Extremities: No peripheral edema  Neurological: A&Ox3. No focal deficits  Skin: Warm and dry, no rashes  Psych: appropriate mood and affect    I personally reviewed all imaging, labs and notes/ documentation.     Assessment & Plan:    New onset atrial fibrillation with RVR  Elevated troponin, demand ischemia    HTN, HLD  NIKIA on CPAP  GERD  Hx breast ca s/p R lumpectomy  Hx SVT in setting of COVID s/p CV    Check TSH. On cardizem drip. [id number] 2, start therapeutic lovenox, consult cards, no obvious inciting factors  Home meds not reconciled, will continue once updated, does not want a CPAP ordered here    DVT Prophylaxis: therapeutic lovenox, SCDs  Code Status: Full Code   Disposition: Stepdown       Luana Miramontes DO  Hospitalist   "

## 2025-02-06 NOTE — ED PROVIDER NOTES
HPI   Chief Complaint   Patient presents with    Chest Pain     Afib RVR       HPI  Patient arrives to the emergency department via ambulance for chest discomfort  States this started just prior to arrival  Feels like her heart is racing  However has not felt well throughout the day  Has been more fatigued than normal  States that she works nights and did work last night, so thought this was contributing to her fatigue but she was more fatigued than normal for her.  Did not really want to do much throughout the day.  No fevers.  No new medications.  Has a history of previous rapid heart rate.  Patient was not exactly sure what she was diagnosed with but states that she is not on any medications for it.  States that when she presented to the hospital did March 2023 they tried a few medications and that ultimately performed a cardioversion.  She states she followed up with a cardiologist and everything looked good.  She believes that she was told this was SVT but she is unsure.  No history of A-fib as far she is aware.  Not on blood thinners.        Patient History   No past medical history on file.  No past surgical history on file.  No family history on file.  Social History     Tobacco Use    Smoking status: Not on file    Smokeless tobacco: Not on file   Substance Use Topics    Alcohol use: Not on file    Drug use: Not on file     Review of Systems   Constitutional:  Positive for fatigue.   Respiratory:  Positive for chest tightness.    Cardiovascular:  Positive for chest pain and palpitations.         Physical Exam   ED Triage Vitals   Temp Heart Rate Respirations BP   -- 02/05/25 2100 02/05/25 2100 02/05/25 2100    (!) 207 16 (!) 137/109      Pulse Ox Temp src Heart Rate Source Patient Position   02/05/25 2100 -- -- 02/05/25 2104   100 %   Sitting      BP Location FiO2 (%)     02/05/25 2104 --     Right arm        Physical Exam  Vitals and nursing note reviewed.   Constitutional:       General: She is not in  acute distress.     Appearance: She is not toxic-appearing.   HENT:      Head: Normocephalic.   Cardiovascular:      Rate and Rhythm: Tachycardia present. Rhythm irregular.   Pulmonary:      Effort: Pulmonary effort is normal.      Breath sounds: No wheezing, rhonchi or rales.   Abdominal:      Palpations: Abdomen is soft.      Tenderness: There is no guarding or rebound.   Neurological:      Mental Status: She is alert.         ED Course & MDM   Diagnoses as of 02/05/25 2343   New onset atrial fibrillation (Multi)                 No data recorded     [place] Coma Scale Score: 15 (02/05/25 2100 : Mojgan Shukla RN)                           Medical Decision Making  Patient is awake alert and oriented.  Nontoxic no acute distress but significantly tachycardic.  Has been fatigued and has palpitations.  States has been fatigued all day and just felt very rundown the palpitations started just prior to arrival.  Initial EKG concerning for atrial fibrillation with rapid ventricular response.  Some subtle ST segment depression but no ST elevation.  Consistent with demand, we will however obtain troponins to further assess.  Patient does have a previous history of SVT but no previous history of atrial fibrillation.  Chest x-ray without focal consolidation indicate pneumonia.  No large effusion.  No pneumothorax.  Initial labs without leukocytosis.  Hemoglobin 15.3.  No significant electrolyte abnormalities.  Normal renal function.  BNP without elevation.  Initial troponin of 3, delta 21.  Patient was initiated on Cardizem.  After initial bolus and up titration of the drip, she continued to be tachycardic and she did receive a second bolus.  Patient now with good rate control but remains in A-fib on the monitor.  Rates in the 90s.  Patient will be admitted for further evaluation management of her new atrial fibrillation.    Procedure  ECG 12 lead    Performed by: Brittni Tucker MD  Authorized by: Brittni Tucker MD    ECG  interpreted by ED Physician in the absence of a cardiologist: yes    Comments:      Atrial fibrillation with rapid ventricular response  Rate of 190  No ST segment elevation  Diffuse subtle ST segment depression  QRS duration 82    ECG 12 lead    Performed by: Brittni Tucker MD  Authorized by: Brittni Tucker MD    ECG interpreted by ED Physician in the absence of a cardiologist: yes    Comments:      Repeat EKG  Atrial fibrillation with a rate of 102  No ST segment elevation  Upright T waves leads II, aVF, V3 through V6  T wave inversion lead III  QRS duration 91         Brittni Tucker MD  02/05/25 2322       Brittni Tucker MD  02/05/25 6610

## 2025-02-07 LAB
ATRIAL RATE: 59 BPM
P AXIS: 31 DEGREES
P OFFSET: 203 MS
P ONSET: 150 MS
PR INTERVAL: 150 MS
Q ONSET: 225 MS
QRS COUNT: 10 BEATS
QRS DURATION: 74 MS
QT INTERVAL: 404 MS
QTC CALCULATION(BAZETT): 399 MS
QTC FREDERICIA: 401 MS
R AXIS: -22 DEGREES
T AXIS: 22 DEGREES
T OFFSET: 427 MS
VENTRICULAR RATE: 59 BPM
